# Patient Record
Sex: MALE | Race: WHITE | NOT HISPANIC OR LATINO | Employment: FULL TIME
[De-identification: names, ages, dates, MRNs, and addresses within clinical notes are randomized per-mention and may not be internally consistent; named-entity substitution may affect disease eponyms.]

---

## 2021-05-02 ENCOUNTER — HEALTH MAINTENANCE LETTER (OUTPATIENT)
Age: 51
End: 2021-05-02

## 2021-06-24 ENCOUNTER — OFFICE VISIT (OUTPATIENT)
Dept: FAMILY MEDICINE | Facility: CLINIC | Age: 51
End: 2021-06-24
Payer: COMMERCIAL

## 2021-06-24 VITALS
RESPIRATION RATE: 16 BRPM | SYSTOLIC BLOOD PRESSURE: 134 MMHG | HEIGHT: 72 IN | OXYGEN SATURATION: 96 % | WEIGHT: 226 LBS | BODY MASS INDEX: 30.61 KG/M2 | DIASTOLIC BLOOD PRESSURE: 82 MMHG | HEART RATE: 87 BPM | TEMPERATURE: 96.2 F

## 2021-06-24 DIAGNOSIS — Z00.00 ROUTINE GENERAL MEDICAL EXAMINATION AT A HEALTH CARE FACILITY: Primary | ICD-10-CM

## 2021-06-24 DIAGNOSIS — E78.00 HYPERCHOLESTEROLEMIA: Primary | ICD-10-CM

## 2021-06-24 DIAGNOSIS — R73.01 IMPAIRED FASTING GLUCOSE: ICD-10-CM

## 2021-06-24 DIAGNOSIS — Z13.1 SCREENING FOR DIABETES MELLITUS: ICD-10-CM

## 2021-06-24 DIAGNOSIS — Z13.220 SCREENING FOR HYPERCHOLESTEROLEMIA: ICD-10-CM

## 2021-06-24 DIAGNOSIS — Z12.11 SCREEN FOR COLON CANCER: ICD-10-CM

## 2021-06-24 DIAGNOSIS — Z11.4 SCREENING FOR HIV WITHOUT PRESENCE OF RISK FACTORS: ICD-10-CM

## 2021-06-24 DIAGNOSIS — Z11.59 ENCOUNTER FOR HEPATITIS C SCREENING TEST FOR LOW RISK PATIENT: ICD-10-CM

## 2021-06-24 LAB
CHOLEST SERPL-MCNC: 230 MG/DL
GLUCOSE SERPL-MCNC: 107 MG/DL (ref 70–99)
HCV AB SERPL QL IA: NONREACTIVE
HDLC SERPL-MCNC: 32 MG/DL
HIV 1+2 AB+HIV1 P24 AG SERPL QL IA: NONREACTIVE
LDLC SERPL CALC-MCNC: 137 MG/DL
NONHDLC SERPL-MCNC: 198 MG/DL
TRIGL SERPL-MCNC: 304 MG/DL

## 2021-06-24 PROCEDURE — 80061 LIPID PANEL: CPT | Performed by: FAMILY MEDICINE

## 2021-06-24 PROCEDURE — 36415 COLL VENOUS BLD VENIPUNCTURE: CPT | Performed by: FAMILY MEDICINE

## 2021-06-24 PROCEDURE — 87389 HIV-1 AG W/HIV-1&-2 AB AG IA: CPT | Performed by: FAMILY MEDICINE

## 2021-06-24 PROCEDURE — 86803 HEPATITIS C AB TEST: CPT | Performed by: FAMILY MEDICINE

## 2021-06-24 PROCEDURE — 82947 ASSAY GLUCOSE BLOOD QUANT: CPT | Performed by: FAMILY MEDICINE

## 2021-06-24 PROCEDURE — 99386 PREV VISIT NEW AGE 40-64: CPT | Performed by: FAMILY MEDICINE

## 2021-06-24 RX ORDER — IBUPROFEN 200 MG
200-400 TABLET ORAL
COMMUNITY
End: 2022-05-03

## 2021-06-24 ASSESSMENT — ENCOUNTER SYMPTOMS
HEMATURIA: 0
HEARTBURN: 1
SORE THROAT: 0
JOINT SWELLING: 0
ARTHRALGIAS: 0
NAUSEA: 0
CONSTIPATION: 0
MYALGIAS: 0
FREQUENCY: 0
FEVER: 0
HEMATOCHEZIA: 0
EYE PAIN: 0
DYSURIA: 0
NERVOUS/ANXIOUS: 1
ABDOMINAL PAIN: 0
HEADACHES: 0
SHORTNESS OF BREATH: 0
WEAKNESS: 0
PARESTHESIAS: 0
PALPITATIONS: 1
COUGH: 0
CHILLS: 0
DIZZINESS: 0
DIARRHEA: 0

## 2021-06-24 ASSESSMENT — MIFFLIN-ST. JEOR: SCORE: 1918.13

## 2021-06-24 NOTE — PROGRESS NOTES
"SUBJECTIVE:   CC: Cecilio Blancas is an 50 year old male who presents for preventative health visit.     {Split Bill scripting  The purpose of this visit is to discuss your medical history and prevent health problems before you are sick. You may be responsible for a co-pay, coinsurance, or deductible if your visit today includes services such as checking on a sore throat, having an x-ray or lab test, or treating and evaluating a new or existing condition :171937}  Patient has been advised of split billing requirements and indicates understanding: {Yes and No:923638}  HPI  {Add if <65 person on Medicare  - Required Questions (Optional):754651}  {Outside tests to abstract? :931447}    {additional problems to add (Optional):529488}    Today's PHQ-2 Score: No flowsheet data found.    Abuse: Current or Past(Physical, Sexual or Emotional)- { :421642}  Do you feel safe in your environment? { :112388}    Have you ever done Advance Care Planning? (For example, a Health Directive, POLST, or a discussion with a medical provider or your loved ones about your wishes): { :674836}    Social History     Tobacco Use     Smoking status: Not on file   Substance Use Topics     Alcohol use: Not on file     {Rooming Staff- Complete this question if Prescreen response is not shown below for today's visit. If you drink alcohol do you typically have >3 drinks per day or >7 drinks per week? (Optional):543997}    No flowsheet data found.{add AUDIT responses (Optional) (A score of 7 for adult men is an indication of hazardous drinking; a score of 8 or more is an indication of an alcohol use disorder.  A score of 7 or more for adult women is an indication of hazardous drinking or an alchohol use disorder):355919}    Last PSA: No results found for: PSA    Reviewed orders with patient. Reviewed health maintenance and updated orders accordingly - { :544672::\"Yes\"}  {Chronicprobdata (optional):554488}    Reviewed and updated as needed this " "visit by clinical staff                 Reviewed and updated as needed this visit by Provider                {HISTORY OPTIONS (Optional):267434}    Review of Systems  {MALE ROS (Optional):091334::\"CONSTITUTIONAL: NEGATIVE for fever, chills, change in weight\",\"INTEGUMENTARY/SKIN: NEGATIVE for worrisome rashes, moles or lesions\",\"EYES: NEGATIVE for vision changes or irritation\",\"ENT: NEGATIVE for ear, mouth and throat problems\",\"RESP: NEGATIVE for significant cough or SOB\",\"CV: NEGATIVE for chest pain, palpitations or peripheral edema\",\"GI: NEGATIVE for nausea, abdominal pain, heartburn, or change in bowel habits\",\" male: negative for dysuria, hematuria, decreased urinary stream, erectile dysfunction, urethral discharge\",\"MUSCULOSKELETAL: NEGATIVE for significant arthralgias or myalgia\",\"NEURO: NEGATIVE for weakness, dizziness or paresthesias\",\"PSYCHIATRIC: NEGATIVE for changes in mood or affect\"}    OBJECTIVE:   There were no vitals taken for this visit.    Physical Exam  {Exam Choices (Optional):536834}    {Diagnostic Test Results (Optional):425860::\"Diagnostic Test Results:\",\"Labs reviewed in Epic\"}    ASSESSMENT/PLAN:   {Dia Picklist:298163}    Patient has been advised of split billing requirements and indicates understanding: {YES / NO:210614::\"Yes\"}  COUNSELING:   {MALE COUNSELING MESSAGES:913742::\"Reviewed preventive health counseling, as reflected in patient instructions\"}    There is no height or weight on file to calculate BMI.     {Weight Management Plan (ACO) Complete if BMI is abnormal-  Ages 18-64  BMI >24.9.  Age 65+ with BMI <23 or >30 (Optional):052214}    He has no history on file for tobacco.      Counseling Resources:  ATP IV Guidelines  Pooled Cohorts Equation Calculator  FRAX Risk Assessment  ICSI Preventive Guidelines  Dietary Guidelines for Americans, 2010  USDA's MyPlate  ASA Prophylaxis  Lung CA Screening    Melanie Shen MD  [facility]  "

## 2021-06-24 NOTE — PROGRESS NOTES
SUBJECTIVE:   CC: Cecilio Blancas is an 50 year old male who presents for preventative health visit.   He has questions about cardiac risk factors. His father had a stent at age 50, was having GERD symptoms that precipitated work up.    The ASCVD Risk score (Danica ANGEL Jr., et al., 2013) failed to calculate for the following reasons:    Cannot find a previous HDL lab    Cannot find a previous total cholesterol lab     Wt Readings from Last 4 Encounters:   21 102.5 kg (226 lb)     Healthy Habits:     Getting at least 3 servings of Calcium per day:  NO    Bi-annual eye exam:  NO    Dental care twice a year:  Yes    Sleep apnea or symptoms of sleep apnea:  Daytime drowsiness    Diet:  Regular (no restrictions)    Frequency of exercise:  6-7 days/week    Duration of exercise:  30-45 minutes    Taking medications regularly:  Yes    Medication side effects:  Not applicable    PHQ-2 Total Score: 1    Additional concerns today:  No    Today's PHQ-2 Score:   PHQ-2 (  [employer]) 2021   Q1: Little interest or pleasure in doing things 0   Q2: Feeling down, depressed or hopeless 1   PHQ-2 Score 1   Q1: Little interest or pleasure in doing things Not at all   Q2: Feeling down, depressed or hopeless Several days   PHQ-2 Score 1       Abuse: Current or Past(Physical, Sexual or Emotional)- No  Do you feel safe in your environment? Yes    Have you ever done Advance Care Planning? (For example, a Health Directive, POLST, or a discussion with a medical provider or your loved ones about your wishes): No, advance care planning information given to patient to review.  Patient plans to discuss their wishes with loved ones or provider.      Social History     Tobacco Use     Smoking status: Former Smoker     Packs/day: 1.00     Years: 10.00     Pack years: 10.00     Types: Cigarettes     Start date: 1991     Quit date: 2017     Years since quittin.4     Smokeless tobacco: Never Used   Substance Use Topics      Alcohol use: Yes     If you drink alcohol do you typically have >3 drinks per day or >7 drinks per week? No    Alcohol Use 2021   Prescreen: >3 drinks/day or >7 drinks/week? No   No flowsheet data found.    Last PSA: No results found for: PSA    Reviewed orders with patient. Reviewed health maintenance and updated orders accordingly - Yes  BP Readings from Last 3 Encounters:   21 134/82    Wt Readings from Last 3 Encounters:   21 102.5 kg (226 lb)                  Patient Active Problem List   Diagnosis     Late latent syphilis     Tobacco use     Past Surgical History:   Procedure Laterality Date     BIOPSY  2017    benign mole     COLONOSCOPY         Social History     Tobacco Use     Smoking status: Former Smoker     Packs/day: 1.00     Years: 10.00     Pack years: 10.00     Types: Cigarettes     Start date: 1991     Quit date: 2017     Years since quittin.4     Smokeless tobacco: Never Used   Substance Use Topics     Alcohol use: Yes     Family History   Problem Relation Age of Onset     Diabetes Father      Coronary Artery Disease Father 50        stent placed at age 50 and age 76     Hypertension Father      Hyperlipidemia Father      Osteoporosis Mother          Current Outpatient Medications   Medication Sig Dispense Refill     ibuprofen (ADVIL/MOTRIN) 200 MG tablet Take 200-400 mg by mouth       Allergies   Allergen Reactions     Sulfa Drugs Unknown     Pt. States Sulfa does not work on him     No lab results found.     Reviewed and updated as needed this visit by clinical staff  Tobacco  Allergies  Meds   Med Hx  Surg Hx  Fam Hx  Soc Hx        Reviewed and updated as needed this visit by Provider                History reviewed. No pertinent past medical history.   Past Surgical History:   Procedure Laterality Date     BIOPSY      benign mole     COLONOSCOPY         Review of Systems   Constitutional: Negative for chills and fever.   HENT: Negative for  congestion, ear pain, hearing loss and sore throat.    Eyes: Positive for visual disturbance. Negative for pain.   Respiratory: Negative for cough and shortness of breath.    Cardiovascular: Positive for palpitations. Negative for chest pain and peripheral edema.   Gastrointestinal: Positive for heartburn. Negative for abdominal pain, constipation, diarrhea, hematochezia and nausea.   Genitourinary: Negative for discharge, dysuria, frequency, genital sores, hematuria, impotence and urgency.   Musculoskeletal: Negative for arthralgias, joint swelling and myalgias.   Skin: Negative for rash.   Neurological: Negative for dizziness, weakness, headaches and paresthesias.   Psychiatric/Behavioral: Negative for mood changes. The patient is nervous/anxious.        OBJECTIVE:   /82 (BP Location: Right arm, Patient Position: Sitting, Cuff Size: Adult Large)   Pulse 87   Temp 96.2  F (35.7  C) (Temporal)   Resp 16   Ht 1.829 m (6')   Wt 102.5 kg (226 lb)   SpO2 96%   BMI 30.65 kg/m      Physical Exam  GENERAL: healthy, alert and no distress  EYES: Eyes grossly normal to inspection, PERRL and conjunctivae and sclerae normal  HENT: normal cephalic/atraumatic and ear canals and TM's normal  NECK: no adenopathy, no asymmetry, masses, or scars and thyroid normal to palpation  RESP: lungs clear to auscultation - no rales, rhonchi or wheezes  CV: regular rate and rhythm, normal S1 S2, no S3 or S4, no murmur, click or rub, no peripheral edema and peripheral pulses strong  ABDOMEN: soft, nontender, no hepatosplenomegaly, no masses and bowel sounds normal  MS: no gross musculoskeletal defects noted, no edema  SKIN: no suspicious lesions or rashes  NEURO: Normal strength and tone, mentation intact and speech normal  PSYCH: mentation appears normal, affect normal/bright    ASSESSMENT/PLAN:   1. Routine general medical examination at a health care facility  routine    2. BMI 30.0-30.9,adult  Weight gain since quitting smoking  and covid pandemic quarantine, I discussed intermittent fasting, portion control, incorporating more activity    3. Screening for HIV without presence of risk factors  - HIV Antigen Antibody Combo    4. Encounter for hepatitis C screening test for low risk patient  - Hepatitis C Screen Reflex to HCV RNA Quant and Genotype    5. Screening for hypercholesterolemia  - Lipid panel reflex to direct LDL Fasting    6. Screen for colon cancer  - GASTROENTEROLOGY ADULT REF PROCEDURE ONLY; Future    7. Screening for diabetes mellitus  - GLUCOSE    Patient has been advised of split billing requirements and indicates understanding: No  COUNSELING:   Reviewed preventive health counseling, as reflected in patient instructions    Estimated body mass index is 30.65 kg/m  as calculated from the following:    Height as of this encounter: 1.829 m (6').    Weight as of this encounter: 102.5 kg (226 lb).     Weight management plan: Discussed healthy diet and exercise guidelines    He reports that he quit smoking about 4 years ago. His smoking use included cigarettes. He started smoking about 30 years ago. He has a 10.00 pack-year smoking history. He has never used smokeless tobacco.      Counseling Resources:  ATP IV Guidelines  Pooled Cohorts Equation Calculator  FRAX Risk Assessment  ICSI Preventive Guidelines  Dietary Guidelines for Americans, 2010  USDA's MyPlate  ASA Prophylaxis  Lung CA Screening    Melanie Shen MD  [facility]

## 2021-06-25 NOTE — CONFIDENTIAL NOTE
ASSESSMENT / PLAN:  (E78.00) Hypercholesterolemia  (primary encounter diagnosis)  Comment:   Plan: Lipid panel reflex to direct LDL Fasting            (R73.01) Impaired fasting glucose  Comment:   Plan: **Glucose FUTURE anytime          Repeat labs in 3 months.    Sincerely,  Dr. Melanie Shen MD  6/24/2021

## 2021-06-25 NOTE — RESULT ENCOUNTER NOTE
It was a pleasure to meet you today!  Your labs are back. Both your cholesterol and fasting blood sugars are a little high. This can definitely be related to weight gain, so if you feel that you have a good plan to lose weight, I just recommend rechecking these labs again in 3-6 months.  If you have any concerns, or if you'd like a referral to a weight loss program, let me know. [facility] has a number of different programs.    Let me know how I can help!    Sincerely,  Dr. Melanie Shen MD  6/24/2021

## 2021-06-25 NOTE — RESULT ENCOUNTER NOTE
Your hiv test was negative for infection, you do NOT have this disease.     Sincerely,  Dr. Melanie Shen MD  6/24/2021

## 2021-07-02 ENCOUNTER — TELEPHONE (OUTPATIENT)
Dept: GASTROENTEROLOGY | Facility: OUTPATIENT CENTER | Age: 51
End: 2021-07-02

## 2021-07-02 DIAGNOSIS — Z11.59 ENCOUNTER FOR SCREENING FOR OTHER VIRAL DISEASES: Primary | ICD-10-CM

## 2021-07-02 NOTE — TELEPHONE ENCOUNTER
"Screening Questions  1. Are you active on mychart?Y    2. What insurance is in the chart? [facility]    2.  Ordering/Referring Provider:     3. BMI : 6'0\"/225=30.5    4. Are you on daily home oxygen? N    5. Do you have a history of difficult airway? N    6. Have you had a heart, lung, or liver transplant? N    7. Are you currently on dialysis? N    8. Have you had a stroke or Transient ischemic atttack (TIA) within 6 months? N    9. In the past 6 months, have you had any heart related issues including cardiomyopathy or heart attack?         If yes, did it require cardiac stenting or other implantable device?N    10. Do you have any implantable devices in your body (pacemaker, defib, LVAD)? N    11. Do you take nitroglycerin? If yes, how often? N    12. Are you currently taking any blood thinners?N    13. Are you a diabetic? N    14. (Females) Are you currently pregnant? N/A  If yes, how many weeks?    15. Have you had a procedure in the past that was difficult to tolerate with conscious sedation? Any allergies to Fentanyl or Versed N    16. Are you taking any scheduled prescription narcotics more than once daily? N    17. Do you have any chemical dependencies such as alcohol, street drugs, or methadone? N    18. Do you have any history of post-traumatic stress syndrome or mental health issues? N    19. Do you transfer independently? Y    20.  Do you have any issues with constipation? N    21. Preferred Pharmacy for Pre Prescription [facility] [id number] [place] [address]    Scheduling Details    Procedure Scheduled: COLON-CS  Provider/Surgeon: brenna guzman  Date of Procedure: 7/12  Location: [place]  Caller (Please ask for phone number if not scheduled by patient): PATIENT      Sedation Type: CS  Conscious Sedation- Needs  for 6 hours after the procedure  MAC/General-Needs  for 24 hours after procedure    Pre-op Required at [facility], and OR for MAC sedation:   (if yes advise patient they " will need a pre-op prior to procedure)      Is patient on blood thinners? -N (If yes- inform patient to follow up with PCP or provider for follow up instructions)     Informed patient they will need an adult  Y  Cannot take any type of public or medical transportation alone    Informed Patient of COVID Test Requirement Y-SCHED    Confirmed Nurse will call to complete assessment Y    Additional comments:

## 2021-07-08 DIAGNOSIS — Z11.59 ENCOUNTER FOR SCREENING FOR OTHER VIRAL DISEASES: ICD-10-CM

## 2021-07-08 LAB
LABORATORY COMMENT REPORT: NORMAL
SARS-COV-2 RNA RESP QL NAA+PROBE: NEGATIVE
SARS-COV-2 RNA RESP QL NAA+PROBE: NORMAL
SPECIMEN SOURCE: NORMAL
SPECIMEN SOURCE: NORMAL

## 2021-07-08 PROCEDURE — U0003 INFECTIOUS AGENT DETECTION BY NUCLEIC ACID (DNA OR RNA); SEVERE ACUTE RESPIRATORY SYNDROME CORONAVIRUS 2 (SARS-COV-2) (CORONAVIRUS DISEASE [COVID-19]), AMPLIFIED PROBE TECHNIQUE, MAKING USE OF HIGH THROUGHPUT TECHNOLOGIES AS DESCRIBED BY CMS-2020-01-R: HCPCS | Performed by: INTERNAL MEDICINE

## 2021-07-08 PROCEDURE — U0005 INFEC AGEN DETEC AMPLI PROBE: HCPCS | Performed by: INTERNAL MEDICINE

## 2021-07-12 ENCOUNTER — HOSPITAL ENCOUNTER (OUTPATIENT)
Facility: CLINIC | Age: 51
Discharge: HOME | End: 2021-07-12
Attending: INTERNAL MEDICINE | Admitting: INTERNAL MEDICINE
Payer: COMMERCIAL

## 2021-07-12 VITALS
BODY MASS INDEX: 30.61 KG/M2 | SYSTOLIC BLOOD PRESSURE: 139 MMHG | OXYGEN SATURATION: 95 % | HEIGHT: 72 IN | HEART RATE: 90 BPM | RESPIRATION RATE: 18 BRPM | WEIGHT: 226 LBS | DIASTOLIC BLOOD PRESSURE: 90 MMHG

## 2021-07-12 LAB — COLONOSCOPY: NORMAL

## 2021-07-12 PROCEDURE — 45380 COLONOSCOPY AND BIOPSY: CPT | Mod: PT,XU | Performed by: INTERNAL MEDICINE

## 2021-07-12 PROCEDURE — G0500 MOD SEDAT ENDO SERVICE >5YRS: HCPCS | Performed by: INTERNAL MEDICINE

## 2021-07-12 PROCEDURE — 45385 COLONOSCOPY W/LESION REMOVAL: CPT | Performed by: INTERNAL MEDICINE

## 2021-07-12 PROCEDURE — 88305 TISSUE EXAM BY PATHOLOGIST: CPT | Mod: TC | Performed by: INTERNAL MEDICINE

## 2021-07-12 PROCEDURE — 250N000011 HC RX IP 250 OP 636: Performed by: INTERNAL MEDICINE

## 2021-07-12 RX ORDER — FENTANYL CITRATE 50 UG/ML
INJECTION, SOLUTION INTRAMUSCULAR; INTRAVENOUS PRN
Status: COMPLETED | OUTPATIENT
Start: 2021-07-12 | End: 2021-07-12

## 2021-07-12 RX ORDER — ONDANSETRON 2 MG/ML
4 INJECTION INTRAMUSCULAR; INTRAVENOUS
Status: CANCELLED | OUTPATIENT
Start: 2021-07-12

## 2021-07-12 RX ORDER — LIDOCAINE 40 MG/G
CREAM TOPICAL
Status: CANCELLED | OUTPATIENT
Start: 2021-07-12

## 2021-07-12 RX ADMIN — FENTANYL CITRATE 25 MCG: 50 INJECTION, SOLUTION INTRAMUSCULAR; INTRAVENOUS at 14:23

## 2021-07-12 RX ADMIN — MIDAZOLAM 2 MG: 1 INJECTION INTRAMUSCULAR; INTRAVENOUS at 14:21

## 2021-07-12 RX ADMIN — MIDAZOLAM 2 MG: 1 INJECTION INTRAMUSCULAR; INTRAVENOUS at 14:17

## 2021-07-12 RX ADMIN — FENTANYL CITRATE 100 MCG: 50 INJECTION, SOLUTION INTRAMUSCULAR; INTRAVENOUS at 14:19

## 2021-07-12 ASSESSMENT — MIFFLIN-ST. JEOR: SCORE: 1918.13

## 2021-07-14 LAB
PATH REPORT.COMMENTS IMP SPEC: NORMAL
PATH REPORT.COMMENTS IMP SPEC: NORMAL
PATH REPORT.FINAL DX SPEC: NORMAL
PATH REPORT.GROSS SPEC: NORMAL
PATH REPORT.MICROSCOPIC SPEC OTHER STN: NORMAL
PATH REPORT.RELEVANT HX SPEC: NORMAL
PHOTO IMAGE: NORMAL

## 2021-07-14 PROCEDURE — 88305 TISSUE EXAM BY PATHOLOGIST: CPT | Mod: 26 | Performed by: PATHOLOGY

## 2021-10-17 ENCOUNTER — HEALTH MAINTENANCE LETTER (OUTPATIENT)
Age: 51
End: 2021-10-17

## 2022-05-06 ENCOUNTER — OFFICE VISIT (OUTPATIENT)
Dept: FAMILY MEDICINE | Facility: CLINIC | Age: 52
End: 2022-05-06
Payer: COMMERCIAL

## 2022-05-06 VITALS
SYSTOLIC BLOOD PRESSURE: 134 MMHG | TEMPERATURE: 97.2 F | OXYGEN SATURATION: 95 % | HEIGHT: 72 IN | HEART RATE: 72 BPM | RESPIRATION RATE: 14 BRPM | WEIGHT: 235 LBS | DIASTOLIC BLOOD PRESSURE: 84 MMHG | BODY MASS INDEX: 31.83 KG/M2

## 2022-05-06 DIAGNOSIS — M75.22 BICEPS TENDONITIS, LEFT: Primary | ICD-10-CM

## 2022-05-06 PROCEDURE — 99213 OFFICE O/P EST LOW 20 MIN: CPT | Performed by: FAMILY MEDICINE

## 2022-05-06 NOTE — PROGRESS NOTES
"bicep  Assessment & Plan     (M75.22) Biceps tendonitis, left  (primary encounter diagnosis)  Comment: home cares as per AVS, reassurance.    No follow-ups on file.    Melanie Shen MD  [facility]    Alka Tatum is a 51 year old who presents for the following health issues :    2 months ago lifted a 150 lb shelf with his left arm and noticed a sudden pain at the top of his bicep, had severe pain and couldn't move his left arm without pain for 2 weeks after that. He didn't take any medication. He reports full range of motion now, normal strength in left arm but lifting anything heavy causes pain in the same area. He denies numbness or tingling of hard or hand.  No prior shoulder injury.    Musculoskeletal Problem    History of Present Illness       Reason for visit:  Shoulder pain.  Symptom onset:  More than a month  Symptoms include:  Pain.  Symptom intensity:  Moderate  Symptom progression:  Improving  Had these symptoms before:  No  What makes it worse:  Lifting.    He eats 0-1 servings of fruits and vegetables daily.He consumes 1 sweetened beverage(s) daily.He exercises with enough effort to increase his heart rate 30 to 60 minutes per day.  He exercises with enough effort to increase his heart rate 7 days per week.   He is taking medications regularly.     Review of Systems   Constitutional, HEENT, cardiovascular, pulmonary, GI, ,  neuro, skin, endocrine and psych systems are negative, except as otherwise noted.      Objective    /84 (BP Location: Right arm, Patient Position: Chair, Cuff Size: Adult Large)   Pulse 72   Temp 97.2  F (36.2  C) (Temporal)   Resp 14   Ht 1.816 m (5' 11.5\")   Wt 106.6 kg (235 lb)   SpO2 95%   BMI 32.32 kg/m    Body mass index is 32.32 kg/m .  Physical Exam   GENERAL: healthy, alert and no distress  NECK: no adenopathy, no asymmetry, masses, or scars and thyroid normal to palpation  RESP: lungs clear to auscultation - no rales, " rhonchi or wheezes  CV: regular rate and rhythm, normal S1 S2, no S3 or S4, no murmur, click or rub, no peripheral edema and peripheral pulses strong  MS: no gross musculoskeletal defects noted, no edema  Shoulder exam - both sides normal; full range of motion, no pain on motion, no tenderness or deformity noted. Mild pain on palpation at origin insertion of biceps.

## 2022-05-06 NOTE — PATIENT INSTRUCTIONS
Acetaminophen/ibuprofen if needed, consider ice after a lot of activity. Continue range of motion and no sudden jerks or pulls.    You don't have any immunization records in your [place] Immunization Information Connection (MIIC), so I suspect you're in the system under a different name.    Email: [email or web address]    Fax: [phone] (ATTN: MIIC Students)    Mail:  [facility]  [address] [address]  [place] [address]

## 2022-07-24 ENCOUNTER — HEALTH MAINTENANCE LETTER (OUTPATIENT)
Age: 52
End: 2022-07-24

## 2022-10-02 ENCOUNTER — HEALTH MAINTENANCE LETTER (OUTPATIENT)
Age: 52
End: 2022-10-02

## 2023-02-20 ENCOUNTER — OFFICE VISIT (OUTPATIENT)
Dept: FAMILY MEDICINE | Facility: CLINIC | Age: 53
End: 2023-02-20
Payer: COMMERCIAL

## 2023-02-20 VITALS
WEIGHT: 233 LBS | RESPIRATION RATE: 17 BRPM | HEIGHT: 71 IN | HEART RATE: 88 BPM | TEMPERATURE: 97.3 F | SYSTOLIC BLOOD PRESSURE: 147 MMHG | DIASTOLIC BLOOD PRESSURE: 87 MMHG | BODY MASS INDEX: 32.62 KG/M2 | OXYGEN SATURATION: 98 %

## 2023-02-20 DIAGNOSIS — K62.5 RECTAL BLEEDING: Primary | ICD-10-CM

## 2023-02-20 DIAGNOSIS — R03.0 ELEVATED BLOOD PRESSURE READING WITHOUT DIAGNOSIS OF HYPERTENSION: ICD-10-CM

## 2023-02-20 DIAGNOSIS — L29.0 ANAL ITCHING: ICD-10-CM

## 2023-02-20 DIAGNOSIS — K62.89 RECTAL PAIN: ICD-10-CM

## 2023-02-20 LAB
ALBUMIN SERPL BCG-MCNC: 4.5 G/DL (ref 3.5–5.2)
ALP SERPL-CCNC: 83 U/L (ref 40–129)
ALT SERPL W P-5'-P-CCNC: 26 U/L (ref 10–50)
ANION GAP SERPL CALCULATED.3IONS-SCNC: 10 MMOL/L (ref 7–15)
AST SERPL W P-5'-P-CCNC: 25 U/L (ref 10–50)
BASOPHILS # BLD AUTO: 0 10E3/UL (ref 0–0.2)
BASOPHILS NFR BLD AUTO: 1 %
BILIRUB SERPL-MCNC: 0.7 MG/DL
BUN SERPL-MCNC: 8.9 MG/DL (ref 6–20)
CALCIUM SERPL-MCNC: 9.4 MG/DL (ref 8.6–10)
CHLORIDE SERPL-SCNC: 102 MMOL/L (ref 98–107)
CREAT SERPL-MCNC: 1.05 MG/DL (ref 0.67–1.17)
DEPRECATED HCO3 PLAS-SCNC: 27 MMOL/L (ref 22–29)
EOSINOPHIL # BLD AUTO: 0.1 10E3/UL (ref 0–0.7)
EOSINOPHIL NFR BLD AUTO: 2 %
ERYTHROCYTE [DISTWIDTH] IN BLOOD BY AUTOMATED COUNT: 13 % (ref 10–15)
GFR SERPL CREATININE-BSD FRML MDRD: 85 ML/MIN/1.73M2
GLUCOSE SERPL-MCNC: 97 MG/DL (ref 70–99)
HCT VFR BLD AUTO: 44.3 % (ref 40–53)
HGB BLD-MCNC: 15.9 G/DL (ref 13.3–17.7)
IMM GRANULOCYTES # BLD: 0 10E3/UL
IMM GRANULOCYTES NFR BLD: 0 %
INR PPP: 1.04 (ref 0.85–1.15)
LYMPHOCYTES # BLD AUTO: 4 10E3/UL (ref 0.8–5.3)
LYMPHOCYTES NFR BLD AUTO: 46 %
MCH RBC QN AUTO: 27.9 PG (ref 26.5–33)
MCHC RBC AUTO-ENTMCNC: 35.9 G/DL (ref 31.5–36.5)
MCV RBC AUTO: 78 FL (ref 78–100)
MONOCYTES # BLD AUTO: 0.6 10E3/UL (ref 0–1.3)
MONOCYTES NFR BLD AUTO: 6 %
NEUTROPHILS # BLD AUTO: 3.9 10E3/UL (ref 1.6–8.3)
NEUTROPHILS NFR BLD AUTO: 45 %
PLATELET # BLD AUTO: 263 10E3/UL (ref 150–450)
POTASSIUM SERPL-SCNC: 4.2 MMOL/L (ref 3.4–5.3)
PROT SERPL-MCNC: 7.5 G/DL (ref 6.4–8.3)
RBC # BLD AUTO: 5.7 10E6/UL (ref 4.4–5.9)
SODIUM SERPL-SCNC: 139 MMOL/L (ref 136–145)
TSH SERPL DL<=0.005 MIU/L-ACNC: 0.71 UIU/ML (ref 0.3–4.2)
WBC # BLD AUTO: 8.7 10E3/UL (ref 4–11)

## 2023-02-20 PROCEDURE — 85610 PROTHROMBIN TIME: CPT | Performed by: FAMILY MEDICINE

## 2023-02-20 PROCEDURE — 99214 OFFICE O/P EST MOD 30 MIN: CPT | Performed by: FAMILY MEDICINE

## 2023-02-20 PROCEDURE — 36415 COLL VENOUS BLD VENIPUNCTURE: CPT | Performed by: FAMILY MEDICINE

## 2023-02-20 PROCEDURE — 80050 GENERAL HEALTH PANEL: CPT | Performed by: FAMILY MEDICINE

## 2023-02-20 ASSESSMENT — PAIN SCALES - GENERAL: PAINLEVEL: NO PAIN (0)

## 2023-02-20 NOTE — RESULT ENCOUNTER NOTE
Asher Mr. Blancas,  Some of your results came back and are within acceptable limits. -Normal red blood cell (hgb) levels, normal white blood cell count and normal platelet levels.. If you have any further concerns please do not hesitate to contact us by message, phone or making an appointment.  Have a good day   Dr Eitan WATKINS

## 2023-02-20 NOTE — RESULT ENCOUNTER NOTE
Asher Mr. Blancas,  Your results came back and INR is normal , no bleeding issue  If you have any further concerns please do not hesitate to contact us by message, phone or making an appointment.  Have a good day   Dr Eitan WATKINS

## 2023-02-20 NOTE — PROGRESS NOTES
Assessment & Plan     Rectal bleeding  Suspect hemorrhoidal / anal fissure cause of blood in stool  Rectal exam normal, colonoscopy in 2021 biopsies normal, hx of tubular adenoma & rectal scar  Itching could be due to fecal matter irritating skin and skin tags left for ext hemorrhoids   Try Calmoseptine  Increase fiber   Less sitting  Use wet wipes/ water/ bidet to get a  clean & less irritation   See colorectal for further evaluation. Referral in place  BP elevated likely secondary to anxiety, monitor, cut back caffeine  BMI 32: Has apt with PCP for a physical & can recheck BP, BMI , preventive care & lipids then  in may with PCP Dr Shen  - CBC with platelets and differential; Future  - Comprehensive metabolic panel (BMP + Alb, Alk Phos, ALT, AST, Total. Bili, TP); Future  - INR; Future  - TSH with free T4 reflex; Future  - Adult Colorectal Surgery  Referral; Future  - CBC with platelets and differential  - Comprehensive metabolic panel (BMP + Alb, Alk Phos, ALT, AST, Total. Bili, TP)  - INR  - TSH with free T4 reflex    Rectal pain  Possibly resolving anal fissure.plan as above. Currently not bad enough where needs meds.   - CBC with platelets and differential; Future  - Comprehensive metabolic panel (BMP + Alb, Alk Phos, ALT, AST, Total. Bili, TP); Future  - INR; Future  - TSH with free T4 reflex; Future  - Adult Colorectal Surgery  Referral; Future  - CBC with platelets and differential  - Comprehensive metabolic panel (BMP + Alb, Alk Phos, ALT, AST, Total. Bili, TP)  - INR  - TSH with free T4 reflex    Anal itching  Itching could be due to fecal matter irritating skin and skin tags left for ext hemorrhoids   Try Calmoseptine  Increase fiber   Less sitting  Use wet wipes/ water/ bidet to get a  clean & less irritation   See colorectal for further evaluation. Referral in place  - CBC with platelets and differential; Future  - Comprehensive metabolic panel (BMP + Alb, Alk Phos,  "ALT, AST, Total. Bili, TP); Future  - INR; Future  - TSH with free T4 reflex; Future  - Adult Colorectal Surgery  Referral; Future  - CBC with platelets and differential  - Comprehensive metabolic panel (BMP + Alb, Alk Phos, ALT, AST, Total. Bili, TP)  - INR  - TSH with free T4 reflex    Elevated blood pressure reading without diagnosis of hypertension  BMI 32.0-32.9,adult  BP elevated likely secondary to anxiety, monitor, cut back caffeine  BMI 32: Has apt with PCP for a physical & can recheck BP, BMI , preventive care & lipids then  in may with PCP Dr Gray    Ordering of each unique test  26 minutes spent on the date of the encounter doing chart review, history and exam, documentation and further activities per the note     BMI:   Estimated body mass index is 32.5 kg/m  as calculated from the following:    Height as of this encounter: 1.803 m (5' 11\").    Weight as of this encounter: 105.7 kg (233 lb).   Weight management plan: Patient was referred to their PCP to discuss a diet and exercise plan.    Work on weight loss  Regular exercise    Return for Routine preventive, Follow up, in person in may with dr gray .    Adela Laird MD  [facility]    Alka Tatum is a 52 year old, presenting for the following health issues:  Rectal Problem      History of Present Illness       Reason for visit:  Blood in stool  Symptom onset:  1-2 weeks ago  Symptoms include:  Blood in stool  Symptom intensity:  Mild  Symptom progression:  Staying the same  Had these symptoms before:  Yes  Has tried/received treatment for these symptoms:  No  What makes it worse:  Diet changes    He eats 0-1 servings of fruits and vegetables daily.He consumes 0 sweetened beverage(s) daily.He exercises with enough effort to increase his heart rate 30 to 60 minutes per day.  He exercises with enough effort to increase his heart rate 3 or less days per week.   He is taking medications " regularly.    52-year-old gentleman with prior history of late latent syphilis treated in about 20 12-20 15 may be per self-report, allergic to sulfa, under care of PCP Dr. Shen seen by [facility] for physical in 2021 and acute symptom in May 2022, here today for history of bright red blood per rectum that occurred for 2 weeks after having a bowel movement and then resolved with no blood in the past 4 to 5 days.  In the past he has had couple years of on and off rectal irritation and itching.  Seem to be related to flare up when he ate pork products.  But never had amount of blood like he did till recently.  He has had no abdominal pain, heartburn, reflux, nausea vomiting or diarrhea or constipation no black stools.  The bright red blood was dripping after bowel movement and on wiping and sometimes mixed in the stool.  He was not straining.  Reports no history of constipation and no known hemorrhoids.  He had a colonoscopy 2 years ago and showed a tubular adenoma with no high-grade dysplasia and malignancy removed from ascending colon and hyperplastic polyp removed from sigmoid colon and the rectal scar noted was biopsied and showed normal colonic mucosa with no active or chronic colitis.  He does take Tums and Pepcid once in a while but has no regular heartburn  Currently the rectal area feels like there is a cut. No pain with sitting. No rectal bulge.     No nose bleed or bruising. No hx of trauma or rectal instrumentation. Not sexually active since treated fr syphilis reported 8 to 10 yrs ago. No fever or chills, no headache or dizziness, no double or blurry vision, no facial pain, earache, sore throat, runny nose, post nasal drip, no trouble hearing, smelling, tasting or swallowing, no cough , no chest pain, trouble breathing or palpitations,  no weight loss or night sweats. No dysuria, hematuria, frequency, urgency, hesitancy, incontinence, No pelvic complaints. No leg swelling or joint pain. No rash.    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     Former smoker  Alcohol he feels he has cut back   Caffeine drinks about 3 cups a day    Review of Systems   Constitutional, HEENT, cardiovascular, pulmonary, GI, , musculoskeletal, neuro, skin, endocrine and psych systems are negative, except as otherwise noted.      Objective    BP (!) 147/87 (BP Location: Left arm, Patient Position: Sitting, Cuff Size: Adult Large)   Pulse 88   Temp 97.3  F (36.3  C) (Temporal)   Resp 17   Ht 1.803 m (5' 11\")   Wt 105.7 kg (233 lb)   SpO2 98%   BMI 32.50 kg/m    Body mass index is 32.5 kg/m .  Physical Exam   GENERAL: healthy, alert, no distress and obese  EYES: Eyes grossly normal to inspection, PERRL and conjunctivae and sclerae normal  HENT: ear canals and TM's normal, nose and mouth without ulcers or lesions  NECK: no adenopathy, no asymmetry, masses, or scars and thyroid normal to palpation  RESP: lungs clear to auscultation - no rales, rhonchi or wheezes  CV: regular rate and rhythm, normal S1 S2, no S3 or S4, no murmur, click or rub, no peripheral edema and peripheral pulses strong  ABDOMEN: soft, non tender, no hepatosplenomegaly, no masses and bowel sounds normal  RECTAL: normal sphincter tone, no rectal masses, prostate normal size, smooth, non tender without nodules or masses, mild discomfort on KELLY posteriorly. No fissure seen or felt. No fistula noted.no blood on glove.  MS: no gross musculoskeletal defects noted, no edema  SKIN: no suspicious lesions or rashes  NEURO: Normal strength and tone, mentation intact and speech normal  PSYCH: mentation appears normal, affect normal/bright    Results for orders placed or performed in visit on 02/20/23   Comprehensive metabolic panel (BMP + Alb, Alk Phos, ALT, AST, Total. Bili, TP)     Status: Normal   Result Value Ref Range    Sodium 139 136 - 145 mmol/L    Potassium 4.2 3.4 - 5.3 mmol/L    Chloride 102 98 - 107 mmol/L    Carbon Dioxide " (CO2) 27 22 - 29 mmol/L    Anion Gap 10 7 - 15 mmol/L    Urea Nitrogen 8.9 6.0 - 20.0 mg/dL    Creatinine 1.05 0.67 - 1.17 mg/dL    Calcium 9.4 8.6 - 10.0 mg/dL    Glucose 97 70 - 99 mg/dL    Alkaline Phosphatase 83 40 - 129 U/L    AST 25 10 - 50 U/L    ALT 26 10 - 50 U/L    Protein Total 7.5 6.4 - 8.3 g/dL    Albumin 4.5 3.5 - 5.2 g/dL    Bilirubin Total 0.7 <=1.2 mg/dL    GFR Estimate 85 >60 mL/min/1.73m2   INR     Status: Normal   Result Value Ref Range    INR 1.04 0.85 - 1.15   TSH with free T4 reflex     Status: Normal   Result Value Ref Range    TSH 0.71 0.30 - 4.20 uIU/mL   CBC with platelets and differential     Status: None   Result Value Ref Range    WBC Count 8.7 4.0 - 11.0 10e3/uL    RBC Count 5.70 4.40 - 5.90 10e6/uL    Hemoglobin 15.9 13.3 - 17.7 g/dL    Hematocrit 44.3 40.0 - 53.0 %    MCV 78 78 - 100 fL    MCH 27.9 26.5 - 33.0 pg    MCHC 35.9 31.5 - 36.5 g/dL    RDW 13.0 10.0 - 15.0 %    Platelet Count 263 150 - 450 10e3/uL    % Neutrophils 45 %    % Lymphocytes 46 %    % Monocytes 6 %    % Eosinophils 2 %    % Basophils 1 %    % Immature Granulocytes 0 %    Absolute Neutrophils 3.9 1.6 - 8.3 10e3/uL    Absolute Lymphocytes 4.0 0.8 - 5.3 10e3/uL    Absolute Monocytes 0.6 0.0 - 1.3 10e3/uL    Absolute Eosinophils 0.1 0.0 - 0.7 10e3/uL    Absolute Basophils 0.0 0.0 - 0.2 10e3/uL    Absolute Immature Granulocytes 0.0 <=0.4 10e3/uL   CBC with platelets and differential     Status: None    Narrative    The following orders were created for panel order CBC with platelets and differential.  Procedure                               Abnormality         Status                     ---------                               -----------         ------                     CBC with platelets and d...[[id number]]                      Final result                 Please view results for these tests on the individual orders.     Results for orders placed or performed in visit on 02/20/23 (from the past 24 hour(s))   CBC  with platelets and differential    Narrative    The following orders were created for panel order CBC with platelets and differential.  Procedure                               Abnormality         Status                     ---------                               -----------         ------                     CBC with platelets and d...[[id number]]                      Final result                 Please view results for these tests on the individual orders.   Comprehensive metabolic panel (BMP + Alb, Alk Phos, ALT, AST, Total. Bili, TP)   Result Value Ref Range    Sodium 139 136 - 145 mmol/L    Potassium 4.2 3.4 - 5.3 mmol/L    Chloride 102 98 - 107 mmol/L    Carbon Dioxide (CO2) 27 22 - 29 mmol/L    Anion Gap 10 7 - 15 mmol/L    Urea Nitrogen 8.9 6.0 - 20.0 mg/dL    Creatinine 1.05 0.67 - 1.17 mg/dL    Calcium 9.4 8.6 - 10.0 mg/dL    Glucose 97 70 - 99 mg/dL    Alkaline Phosphatase 83 40 - 129 U/L    AST 25 10 - 50 U/L    ALT 26 10 - 50 U/L    Protein Total 7.5 6.4 - 8.3 g/dL    Albumin 4.5 3.5 - 5.2 g/dL    Bilirubin Total 0.7 <=1.2 mg/dL    GFR Estimate 85 >60 mL/min/1.73m2   INR   Result Value Ref Range    INR 1.04 0.85 - 1.15   TSH with free T4 reflex   Result Value Ref Range    TSH 0.71 0.30 - 4.20 uIU/mL   CBC with platelets and differential   Result Value Ref Range    WBC Count 8.7 4.0 - 11.0 10e3/uL    RBC Count 5.70 4.40 - 5.90 10e6/uL    Hemoglobin 15.9 13.3 - 17.7 g/dL    Hematocrit 44.3 40.0 - 53.0 %    MCV 78 78 - 100 fL    MCH 27.9 26.5 - 33.0 pg    MCHC 35.9 31.5 - 36.5 g/dL    RDW 13.0 10.0 - 15.0 %    Platelet Count 263 150 - 450 10e3/uL    % Neutrophils 45 %    % Lymphocytes 46 %    % Monocytes 6 %    % Eosinophils 2 %    % Basophils 1 %    % Immature Granulocytes 0 %    Absolute Neutrophils 3.9 1.6 - 8.3 10e3/uL    Absolute Lymphocytes 4.0 0.8 - 5.3 10e3/uL    Absolute Monocytes 0.6 0.0 - 1.3 10e3/uL    Absolute Eosinophils 0.1 0.0 - 0.7 10e3/uL    Absolute Basophils 0.0 0.0 - 0.2 10e3/uL     Absolute Immature Granulocytes 0.0 <=0.4 10e3/uL

## 2023-02-20 NOTE — PATIENT INSTRUCTIONS
Suspect hemorrhoidal / anal fissure cause of blood in stool  Rectal exam normal  Itching could be due to fecal matter irritating skin and skin tags left for ext hemorrhoids   Try Calmoseptine  Increase fiber   Less sitting, Use wet wipes/ water/ bidet to get a  clean & less irritaton   See colorectal for further evaluation  Referral in place  BP elevated likely secondary to anxiety, monitor, cut back caffiene  BMI 32   Has apt with PCP for a physical & can recheck BP, BMI , preventive care & lipids then  in may with PCP Dr Shen

## 2023-02-21 NOTE — RESULT ENCOUNTER NOTE
Asher Mr. Blancas,  Your results came back and are within acceptable limits. -Liver and gallbladder tests are normal (ALT,AST, Alk phos, bilirubin), kidney function is normal (Cr, GFR), sodium is normal, potassium is normal, calcium is normal, glucose is normal.  -TSH (thyroid stimulating hormone) level is normal which indicates normal thyroid function.  . If you have any further concerns please do not hesitate to contact us by message, phone or making an appointment.  Have a good day   Dr Eitan WATKINS

## 2023-02-22 ENCOUNTER — TELEPHONE (OUTPATIENT)
Dept: SURGERY | Facility: CLINIC | Age: 53
End: 2023-02-22
Payer: COMMERCIAL

## 2023-02-22 NOTE — TELEPHONE ENCOUNTER
[facility] Call Center    Phone Message    May a detailed message be left on voicemail: yes     Reason for Call: Appointment Intake      Referring Provider Name: Adela Laird MD in [facility]    Diagnosis and/or Symptoms: Rectal bleeding    Scheduled first available, sending for review per guidelines - patient declined [facility] - thank you!     Action Taken: Message routed to:  [facility] ([facility] Colorectal     Travel Screening: Not Applicable

## 2023-02-23 NOTE — TELEPHONE ENCOUNTER
Diagnosis, Referred by & from: Rectal bleeding   Appt date: 5/8/2023   NOTES STATUS DETAILS   OFFICE NOTE from referring provider [facility]:  2/20/23 - PCC OV with Dr. Laird   OFFICE NOTE from other specialist [facility]:  6/24/21 - PCC OV with Dr. Shen   DISCHARGE SUMMARY from hospital N/A    DISCHARGE REPORT from the ER N/A    OPERATIVE REPORT N/A    MEDICATION LIST Internal    LABS     BIOPSIES/PATHOLOGY RELATED TO DIAGNOSIS Internal [facility]:  7/12/21 - Colon Biopsy (Case: [id number])   DIAGNOSTIC PROCEDURES     COLONOSCOPY [facility]:  7/12/21 - Colonoscopy   IMAGING (DISC & REPORT) N/A

## 2023-04-18 ENCOUNTER — TELEPHONE (OUTPATIENT)
Dept: FAMILY MEDICINE | Facility: CLINIC | Age: 53
End: 2023-04-18
Payer: COMMERCIAL

## 2023-04-19 NOTE — TELEPHONE ENCOUNTER
Reason for Call:  Appointment Request    Patient requesting this type of appt:  Preventive     Requested provider: Melanie Shen    Reason patient unable to be scheduled: Needs to be scheduled by clinic    When does patient want to be seen/preferred time: original appointment for 5/4, cancelled by clinic    Comments: Patient pretty open for scheduling, notes said okay to use hold spot,  did not see any openings until late June. Please call to advise.    Could we send this information to you in [employer] or would you prefer to receive a phone call?:   Patient would prefer a phone call   Okay to leave a detailed message?: Yes at Cell number on file:    Telephone Information:   Mobile [phone]       Call taken on 4/18/2023 at 7:57 PM by Maty Lorenzo MA

## 2023-05-08 ENCOUNTER — PRE VISIT (OUTPATIENT)
Dept: SURGERY | Facility: CLINIC | Age: 53
End: 2023-05-08

## 2023-06-01 ENCOUNTER — OFFICE VISIT (OUTPATIENT)
Dept: FAMILY MEDICINE | Facility: CLINIC | Age: 53
End: 2023-06-01
Payer: COMMERCIAL

## 2023-06-01 VITALS
BODY MASS INDEX: 31.5 KG/M2 | HEIGHT: 71 IN | TEMPERATURE: 97.5 F | SYSTOLIC BLOOD PRESSURE: 134 MMHG | DIASTOLIC BLOOD PRESSURE: 82 MMHG | WEIGHT: 225 LBS | OXYGEN SATURATION: 98 % | HEART RATE: 88 BPM

## 2023-06-01 DIAGNOSIS — E78.00 HYPERCHOLESTEROLEMIA: ICD-10-CM

## 2023-06-01 DIAGNOSIS — R73.01 IMPAIRED FASTING GLUCOSE: ICD-10-CM

## 2023-06-01 DIAGNOSIS — Z12.5 SCREENING FOR PROSTATE CANCER: ICD-10-CM

## 2023-06-01 DIAGNOSIS — Z00.00 ROUTINE GENERAL MEDICAL EXAMINATION AT A HEALTH CARE FACILITY: Primary | ICD-10-CM

## 2023-06-01 LAB
CHOLEST SERPL-MCNC: 220 MG/DL
HBA1C MFR BLD: 5.2 % (ref 0–5.6)
HDLC SERPL-MCNC: 32 MG/DL
LDLC SERPL CALC-MCNC: 114 MG/DL
NONHDLC SERPL-MCNC: 188 MG/DL
PSA SERPL DL<=0.01 NG/ML-MCNC: 0.87 NG/ML (ref 0–3.5)
TRIGL SERPL-MCNC: 369 MG/DL

## 2023-06-01 PROCEDURE — 80061 LIPID PANEL: CPT | Performed by: FAMILY MEDICINE

## 2023-06-01 PROCEDURE — 99396 PREV VISIT EST AGE 40-64: CPT | Performed by: FAMILY MEDICINE

## 2023-06-01 PROCEDURE — G0103 PSA SCREENING: HCPCS | Performed by: FAMILY MEDICINE

## 2023-06-01 PROCEDURE — 36415 COLL VENOUS BLD VENIPUNCTURE: CPT | Performed by: FAMILY MEDICINE

## 2023-06-01 PROCEDURE — 83036 HEMOGLOBIN GLYCOSYLATED A1C: CPT | Performed by: FAMILY MEDICINE

## 2023-06-01 ASSESSMENT — ENCOUNTER SYMPTOMS
FEVER: 0
SHORTNESS OF BREATH: 0
DIARRHEA: 0
HEADACHES: 0
PARESTHESIAS: 0
EYE PAIN: 0
SORE THROAT: 0
COUGH: 0
PALPITATIONS: 1
CONSTIPATION: 0
ARTHRALGIAS: 0
NERVOUS/ANXIOUS: 0
NAUSEA: 0
CHILLS: 0
JOINT SWELLING: 0
HEMATOCHEZIA: 1
FREQUENCY: 0
DIZZINESS: 0
ABDOMINAL PAIN: 0
DYSURIA: 0
HEARTBURN: 0
WEAKNESS: 0
MYALGIAS: 0
HEMATURIA: 0

## 2023-06-01 NOTE — PROGRESS NOTES
"SUBJECTIVE:   CC: Rc is an 52 year old who presents for preventative health visit.   Impaired fasting glucose Follow-up      Patient is checking blood sugars: not at all    Diabetic concerns: None     Symptoms of hypoglycemia (low blood sugar): none     Paresthesias (numbness or burning in feet) or sores: No        No results found for: A1C             2023     2:38 PM   Additional Questions   Roomed by Alfredo   Accompanied by self     Healthy Habits:    Getting at least 3 servings of Calcium per day:  Yes    Bi-annual eye exam:  NO    Dental care twice a year:  Yes    Sleep apnea or symptoms of sleep apnea:  Excessive snoring    Diet:  Regular (no restrictions)    Frequency of exercise:  6-7 days/week    Duration of exercise:  30-45 minutes    Taking medications regularly:  Not Applicable    Medication side effects:  Not applicable    PHQ-2 Total Score:    Additional concerns today:  No    The 10-year ASCVD risk score (Anthony ZELAYA, et al., 2019) is: 9.1%    Values used to calculate the score:      Age: 52 years      Sex: Male      Is Non- : No      Diabetic: No      Tobacco smoker: No      Systolic Blood Pressure: 134 mmHg      Is BP treated: No      HDL Cholesterol: 32 mg/dL      Total Cholesterol: 230 mg/dL    Wt Readings from Last 4 Encounters:   23 102.1 kg (225 lb)   23 105.7 kg (233 lb)   22 106.6 kg (235 lb)   21 102.5 kg (226 lb)     Estimated body mass index is 31.15 kg/m  as calculated from the following:    Height as of this encounter: 1.81 m (5' 11.26\").    Weight as of this encounter: 102.1 kg (225 lb).      Social History     Tobacco Use     Smoking status: Former     Packs/day: 1.00     Years: 10.00     Pack years: 10.00     Types: Cigarettes     Start date: 1991     Quit date: 2017     Years since quittin.4     Smokeless tobacco: Never   Vaping Use     Vaping status: Never Used   Substance Use Topics     Alcohol use: Yes           " 2023     2:28 PM   Alcohol Use   Prescreen: >3 drinks/day or >7 drinks/week? No       Last PSA: No results found for: PSA    Reviewed orders with patient. Reviewed health maintenance and updated orders accordingly - Yes  BP Readings from Last 3 Encounters:   23 134/82   23 (!) 147/87   22 134/84    Wt Readings from Last 3 Encounters:   23 102.1 kg (225 lb)   23 105.7 kg (233 lb)   22 106.6 kg (235 lb)                  Patient Active Problem List   Diagnosis   (none) - all problems resolved or deleted     Past Surgical History:   Procedure Laterality Date     BIOPSY      benign mole     COLONOSCOPY       COLONOSCOPY N/A 2021    Procedure: Colonoscopy, With Polypectomy And Biopsy;  Surgeon: Matthew Scruggs MD;  Location:  GI      CIRCUMCISION       WISDOM TOOTH EXTRACTION         Social History     Tobacco Use     Smoking status: Former     Packs/day: 1.00     Years: 10.00     Pack years: 10.00     Types: Cigarettes     Start date: 1991     Quit date: 2017     Years since quittin.4     Smokeless tobacco: Never   Vaping Use     Vaping status: Never Used   Substance Use Topics     Alcohol use: Yes     Family History   Problem Relation Age of Onset     Osteoporosis Mother      Diabetes Father      Coronary Artery Disease Father 50     Hypertension Father      Hyperlipidemia Father      Prostate Cancer Father 80             Prostate Problems Father          No current outpatient medications on file.     Allergies   Allergen Reactions     Sulfa Antibiotics Unknown     Pt. States Sulfa does not work on him     Recent Labs   Lab Test 23  1217 21  0911   LDL  --  137*   HDL  --  32*   TRIG  --  304*   ALT 26  --    CR 1.05  --    GFRESTIMATED 85  --    POTASSIUM 4.2  --    TSH 0.71  --         Reviewed and updated as needed this visit by clinical staff   Tobacco  Allergies  Meds              Reviewed and updated as needed this visit  "by Provider                 Past Medical History:   Diagnosis Date     History of latent syphilis     noted in epic treated around 2015        Review of Systems   Constitutional: Negative for chills and fever.   HENT: Negative for congestion, ear pain, hearing loss and sore throat.    Eyes: Negative for pain and visual disturbance.   Respiratory: Negative for cough and shortness of breath.    Cardiovascular: Positive for palpitations. Negative for chest pain and peripheral edema.   Gastrointestinal: Positive for hematochezia. Negative for abdominal pain, constipation, diarrhea, heartburn and nausea.   Genitourinary: Negative for dysuria, frequency, genital sores, hematuria, impotence, penile discharge and urgency.   Musculoskeletal: Negative for arthralgias, joint swelling and myalgias.   Skin: Negative for rash.   Neurological: Negative for dizziness, weakness, headaches and paresthesias.   Psychiatric/Behavioral: Positive for mood changes. The patient is not nervous/anxious.      OBJECTIVE:   /82 (BP Location: Right arm, Patient Position: Sitting, Cuff Size: Adult Regular)   Pulse 88   Temp 97.5  F (36.4  C) (Temporal)   Ht 1.81 m (5' 11.26\")   Wt 102.1 kg (225 lb)   SpO2 98%   BMI 31.15 kg/m      Physical Exam  GENERAL: healthy, alert and no distress  EYES: Eyes grossly normal to inspection, PERRL and conjunctivae and sclerae normal  HENT: ear canals and TM's normal, nose and mouth without ulcers or lesions  NECK: no adenopathy, no asymmetry, masses, or scars and thyroid normal to palpation  RESP: lungs clear to auscultation - no rales, rhonchi or wheezes  CV: regular rate and rhythm, normal S1 S2, no S3 or S4, no murmur, click or rub, no peripheral edema and peripheral pulses strong  ABDOMEN: soft, nontender, no hepatosplenomegaly, no masses and bowel sounds normal  MS: no gross musculoskeletal defects noted, no edema  SKIN: no suspicious lesions or rashes  NEURO: Normal strength and tone, mentation " intact and speech normal  PSYCH: mentation appears normal, affect normal/bright    ASSESSMENT/PLAN:   (Z00.00) Routine general medical examination at a health care facility  (primary encounter diagnosis)  Routine, healthy man    (E78.00) Hypercholesterolemia  Comment:   LDL Cholesterol Calculated   Date Value Ref Range Status   06/24/2021 137 (H) <100 mg/dL Final     Comment:     Above desirable:  100-129 mg/dl  Borderline High:  130-159 mg/dL  High:             160-189 mg/dL  Very high:       >189 mg/dl        Plan: Lipid panel reflex to direct LDL Fasting          (Z12.5) Screening for prostate cancer  Comment: Will fu as indicated.   Plan: PSA, screen            (R73.01) Impaired fasting glucose  Comment: check today  Plan: Hemoglobin A1c        Will fu as indicated.      Patient has been advised of split billing requirements and indicates understanding: Yes      COUNSELING:   Reviewed preventive health counseling, as reflected in patient instructions        He reports that he quit smoking about 6 years ago. His smoking use included cigarettes. He started smoking about 32 years ago. He has a 10.00 pack-year smoking history. He has never used smokeless tobacco.          Melanie Shen MD  [facility]

## 2023-06-01 NOTE — PATIENT INSTRUCTIONS
Shingles vaccine  I strongly recommend getting the shingles vaccine (Shingrix) if you are over age 50, but please check with your insurance to see how much you might have to pay for this vaccine! If you are on most insurance plans including Medicare, it's covered if you get it at a pharmacy but not in a clinic.    This shot will prevent shingles, a painful skin rash that you are at risk for getting if you have ever had chicken pox. Sometimes the pain will last the rest of your life, even after the rash has healed, and there is not much that we can do to relieve the pain. The vaccine is 98% effective at preventing shingles.    Please consider getting this vaccine! You'll need #2 vaccines 2-4 months apart to be protected.      Preventive Health Recommendations  Male Ages 50 - 64    Yearly exam:             See your health care provider every year in order to  o   Review health changes.   o   Discuss preventive care.    o   Review your medicines if your doctor has prescribed any.   Have a cholesterol test every 5 years, or more frequently if you are at risk for high cholesterol/heart disease.   Have a diabetes test (fasting glucose) every three years. If you are at risk for diabetes, you should have this test more often.   Have a colonoscopy at age 50, or have a yearly FIT test (stool test). These exams will check for colon cancer.    Talk with your health care provider about whether or not a prostate cancer screening test (PSA) is right for you.  You should be tested each year for STDs (sexually transmitted diseases), if you re at risk.     Shots: Get a flu shot each year. Get a tetanus shot every 10 years.     Nutrition:  Eat at least 5 servings of fruits and vegetables daily.   Eat whole-grain bread, whole-wheat pasta and brown rice instead of white grains and rice.   Get adequate Calcium and Vitamin D.     Lifestyle  Exercise for at least 150 minutes a week (30 minutes a day, 5 days a week). This will help you  control your weight and prevent disease.   Limit alcohol to one drink per day.   No smoking.   Wear sunscreen to prevent skin cancer.   See your dentist every six months for an exam and cleaning.   See your eye doctor every 1 to 2 years.

## 2023-06-01 NOTE — RESULT ENCOUNTER NOTE
Hello!   Thank you for getting labs done.     Your hemoglobin was normal, you are not anemic.      If you have any questions, please contact the clinic or schedule an appointment with me, thank you!    Sincerely,      MADELINE ARGUETA MD   6/1/2023

## 2023-06-06 DIAGNOSIS — E78.00 HYPERCHOLESTEROLEMIA: Primary | ICD-10-CM

## 2023-06-06 RX ORDER — SIMVASTATIN 40 MG
40 TABLET ORAL AT BEDTIME
Qty: 30 TABLET | Refills: 3 | Status: SHIPPED | OUTPATIENT
Start: 2023-06-06

## 2023-06-06 NOTE — RESULT ENCOUNTER NOTE
Hello!  It was a pleasure to see you in clinic!  Thank you for getting labs done, all the results are back.    Your PSA (prostate cancer blood test)  is normal.     Your HgA1C, also called glycosylated hemoglobin, which measures the level of sugar in your blood over the past few months, is normal, you do not have diabetes or prediabetes! Good job! We will keep checking this lab every year.    Thank you for getting your cholesterol checked. Your total cholesterol and triglycerides are very elevated, and your HDL is too low. This does raise your risk of heart disease or stroke in the next 10 years to 8%. The cut off for starting medication is usually around 7%, so especially given your family history, I do recommend that you start a cholesterol lowering medication called a statin.  I sent a prescription for atorvastatin to your [facility] in [place]. Please take this medication at night, as it works best that way.    Desired or goal levels are:  CHOLESTEROL: Desirable is less than 200.  HDL (Good Cholesterol): Desirable is greater than 40 in men and greater than 50 in women.  LDL (Bad Cholesterol): Desirable is less than 130  TRIGLYCERIDES: Desirable is less than 150.    Please follow the recommendations below and schedule a lab only appointment ([phone]) in 2-3 months for a repeat fasting test. Please don't have anything to eat or drink (except water) for 8 hours prior to the test.    To keep triglycerides in check,  reduce carbohydrates/sugar in your diet by reducing portion size of carbohydrates including sweets, juice, alcohol, white bread, crackers, pasta, rice, potatoes and cereal.     You can also reduce your triglycerides by increasing your activity level. Exercise for at least 30 min 5 times per week, and lift weights 2-3 times per week to build muscle mass and improve your metabolism.    Intermittent fasting, allowing at least 12-14 hours between dinner and breakfast can also help lower your total and LDL  cholesterol as well as your triglycerides. The goal is to try to push all your daily calories into a window between say 8 am and 6 pm, and really try to reduce calories consumed in the afternoon and evening, aiming for not eating at all right before bed.     As you may know, abnormal cholesterol is one factor that increases your risk for heart disease and stroke. You can improve your cholesterol by controlling the amount and type of fat you eat and by increasing your daily activity level.    Here are some ways to improve your nutrition (adapted from the [employer] handout):  Eat less fat (especially butter, Crisco and other saturated fats)  Buy lean cuts of meat; reduce your portions of red meat or substitute poultry or fish, or avoid meat altogether.  Use skim milk and low-fat dairy products  Eat no more than 4 egg yolks per week  Avoid fried or fast foods that are high in fat  Eat more fruits and vegetables, trying to make your plate of food at least half non-starchy vegetables.    Please schedule a follow up appointment with me in 2-3 months after the repeat labs are back to go over the cholesterol numbers.  If you'd like an office visit sooner than you can get in for an in person visit, you can schedule a virtual appointment.  I have virtual appointments on Tuesdays, and usually you can get in within 1-2 weeks.      If you have any questions, please let me know, thank you!      Sincerely,  Dr. Melanie Shen MD  6/6/2023

## 2024-05-02 ENCOUNTER — PATIENT OUTREACH (OUTPATIENT)
Dept: CARE COORDINATION | Facility: CLINIC | Age: 54
End: 2024-05-02
Payer: COMMERCIAL

## 2024-05-16 ENCOUNTER — PATIENT OUTREACH (OUTPATIENT)
Dept: CARE COORDINATION | Facility: CLINIC | Age: 54
End: 2024-05-16
Payer: COMMERCIAL

## 2024-06-21 ENCOUNTER — MYC MEDICAL ADVICE (OUTPATIENT)
Dept: FAMILY MEDICINE | Facility: CLINIC | Age: 54
End: 2024-06-21

## 2024-06-21 NOTE — TELEPHONE ENCOUNTER
Writer responded via [employer].  Andra PRINGLE, BSN, PHN, RN  [facility]  [phone]

## 2024-06-25 ENCOUNTER — OFFICE VISIT (OUTPATIENT)
Dept: URGENT CARE | Facility: URGENT CARE | Age: 54
End: 2024-06-25
Payer: COMMERCIAL

## 2024-06-25 ENCOUNTER — ANCILLARY PROCEDURE (OUTPATIENT)
Dept: GENERAL RADIOLOGY | Facility: CLINIC | Age: 54
End: 2024-06-25
Attending: FAMILY MEDICINE
Payer: COMMERCIAL

## 2024-06-25 VITALS
DIASTOLIC BLOOD PRESSURE: 93 MMHG | WEIGHT: 220 LBS | HEART RATE: 88 BPM | HEIGHT: 71 IN | BODY MASS INDEX: 30.8 KG/M2 | SYSTOLIC BLOOD PRESSURE: 149 MMHG | OXYGEN SATURATION: 97 % | TEMPERATURE: 97.5 F

## 2024-06-25 DIAGNOSIS — R07.81 RIB PAIN ON RIGHT SIDE: Primary | ICD-10-CM
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
## 2024-06-25 PROCEDURE — 99214 OFFICE O/P EST MOD 30 MIN: CPT | Performed by: FAMILY MEDICINE

## 2024-06-25 PROCEDURE — 71101 X-RAY EXAM UNILAT RIBS/CHEST: CPT | Mod: TC | Performed by: RADIOLOGY
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
## 2024-07-10 ENCOUNTER — OFFICE VISIT (OUTPATIENT)
Dept: URGENT CARE | Facility: URGENT CARE | Age: 54
End: 2024-07-10
Payer: COMMERCIAL

## 2024-07-10 VITALS
DIASTOLIC BLOOD PRESSURE: 96 MMHG | SYSTOLIC BLOOD PRESSURE: 167 MMHG | OXYGEN SATURATION: 96 % | BODY MASS INDEX: 31.1 KG/M2 | TEMPERATURE: 97.2 F | RESPIRATION RATE: 16 BRPM | HEART RATE: 82 BPM | WEIGHT: 223 LBS

## 2024-07-10 DIAGNOSIS — S61.217A LACERATION OF LEFT LITTLE FINGER WITHOUT DAMAGE TO NAIL, FOREIGN BODY PRESENCE UNSPECIFIED, INITIAL ENCOUNTER: Primary | ICD-10-CM

## 2024-07-10 PROCEDURE — 12001 RPR S/N/AX/GEN/TRNK 2.5CM/<: CPT | Performed by: NURSE PRACTITIONER

## 2024-07-10 PROCEDURE — 90471 IMMUNIZATION ADMIN: CPT | Performed by: NURSE PRACTITIONER

## 2024-07-10 PROCEDURE — 90715 TDAP VACCINE 7 YRS/> IM: CPT | Performed by: NURSE PRACTITIONER

## 2024-07-10 ASSESSMENT — PAIN SCALES - GENERAL: PAINLEVEL: NO PAIN (0)

## 2024-07-10 NOTE — PATIENT INSTRUCTIONS
Laceration to left pinky finger repaired with sutures  Keep dressing clean dry intact for the first 48 hours  Lukewarm soapy water and bacitracin zinc new bandage daily thereafter  Stitches out in about 10-14 days if well-approximated  Tdap today  Reevaluation if signs of infection such as redness warmth pus profound pain

## 2024-07-10 NOTE — PROGRESS NOTES
SUBJECTIVE:     Chief Complaint   Patient presents with    Urgent Care    Laceration     Left 5th finger cut today on pruning agnes     Cecilio Blancas is a 53 year old male who presents to the clinic with a laceration on the left pinky sustained 4 hour(s) ago.  This is a non-work related injury.    Mechanism of injury: pruning sheers    Associated symptoms: Denies weakness, or loss of function, slight numbness near lac  Last tetanus booster within 10 years: yes 2016, dirty wound old pruning sheers    EXAM:   The patient appears today in alert,no apparent distress distress  VITALS: BP (!) 167/96   Pulse 82   Temp 97.2  F (36.2  C) (Temporal)   Resp 16   Wt 101.2 kg (223 lb)   SpO2 96%   BMI 31.10 kg/m      Size of laceration: 2.5 centimeters  Characteristics of the laceration: bleeding- mild and straight  Tendon function intact: yes  Sensation to light touch intact: yes some numbness  Pulses intact: yes    Assessment:  Laceration of left little finger without damage to nail, foreign body presence unspecified, initial encounter    PLAN:  PROCEDURE NOTE::  Wound was locally injected with 3 cc's of Lidocaine 2% plain  Wound cleaned with HIBICLENS  Wound cleaned with saline  Wound soaked  Wound irrigated  Laceration was closed using 4 black 4-0, & 1 blue 5-0 interrupted sutures    After care instructions:  Laceration to left pinky finger repaired with sutures  Keep dressing clean dry intact for the first 48 hours  Lukewarm soapy water and bacitracin zinc new bandage daily thereafter  Stitches out in about 10-14 days if well-approximated  Tdap today  Reevaluation if signs of infection such as redness warmth pus profound pain

## 2024-07-24 ENCOUNTER — ALLIED HEALTH/NURSE VISIT (OUTPATIENT)
Dept: FAMILY MEDICINE | Facility: CLINIC | Age: 54
End: 2024-07-24
Payer: COMMERCIAL

## 2024-07-24 DIAGNOSIS — Z48.02 VISIT FOR SUTURE REMOVAL: Primary | ICD-10-CM

## 2024-07-24 PROCEDURE — 99207 PR NO CHARGE NURSE ONLY: CPT

## 2024-07-24 NOTE — PROGRESS NOTES
Cecilio Blancas presents to the clinic for removal of sutures and sutures,staples, steri strips. The patient has had sutures in place for 14 days. There has been no patient reported signs or symptoms of infection or drainage. 5  sutures and sutures,staples, staple, steri strips are seen and located on the left small finger.. Tetanus status is up to date. All sutures were easily removed today.   Steri strips placed on wound.Routine wound care discussed by the RN or provider. The patient will follow up as needed.     Eugene Figueroa RN-[facility]

## 2024-07-27 ENCOUNTER — HEALTH MAINTENANCE LETTER (OUTPATIENT)
Age: 54
End: 2024-07-27

## 2025-08-10 ENCOUNTER — HEALTH MAINTENANCE LETTER (OUTPATIENT)
Age: 55
End: 2025-08-10

## (undated) RX ORDER — FENTANYL CITRATE 50 UG/ML
INJECTION, SOLUTION INTRAMUSCULAR; INTRAVENOUS
Status: DISPENSED
Start: 2021-07-12